# Patient Record
(demographics unavailable — no encounter records)

---

## 2024-10-14 NOTE — PHYSICAL EXAM
[Normal Appearance] : normal appearance [Edema] : no peripheral edema [Exaggerated Use Of Accessory Muscles For Inspiration] : no accessory muscle use [Bowel Sounds] : normal bowel sounds [Abdomen Tenderness] : non-tender [Costovertebral Angle Tenderness] : no ~M costovertebral angle tenderness [Urethral Meatus] : meatus normal [Epididymis] : the epididymides were normal [Testes Tenderness] : no tenderness of the testes [Testes Mass (___cm)] : there were no testicular masses [Normal Station and Gait] : the gait and station were normal for the patient's age [] : no rash

## 2024-10-14 NOTE — ASSESSMENT
[FreeTextEntry1] : 67 y.o. M who presents for gross hematuria, BPH  #Gross hematuria - s/p cystoscopy - negative cytology - CT w/ contrast reviewed - no hydro / renal / ureteral mass - LIkely prostatic bleeding  #BPH  - c/w finasteride - To start flomax -  mL - Discussed the natural history of BPH and bladder outlet obstruction, risks of progression, risks of detrusor myopathy/areflexic bladder, bladder stones, UTI, worsening symptoms, risk of retention and other issues.  - All treatment options were reviewed.  This included surveillance, all medical therapeutic options, all outlet procedures including office based, TURP, bipolar TURP, button vaporization, Rezum, Aquablation, Urolift, thulium/holmium, suprapubic/retropubic simple (open, robotic) prostatectomy, PAE. Side effects discussed  They would like to observe symptoms for now and c/w medical therapy  rpa 3 months

## 2024-10-14 NOTE — HISTORY OF PRESENT ILLNESS
[FreeTextEntry1] : 67 y.o. M who presents as a f/u for BPH  To review, he is a 67 y.o. M who presents as a f/u for hematuria To review, he underwent greenlight PVP 2014. Recently seen by Dr Pryor for gross hematuria 10/1/2024. Then went to ER the next day for gross hematuria / retention.  Underwent CT scan which demonstrated trilobar hyperplasia w/ enlarged median lobe, some clot in the bladder. Then seen by Dr Pryor 10/3/2024 for cystoscopy - trilobar hypertrophy, trabeculations.  Cooper removed and patient able to void  He is on finasteride. Was prescribed tamsulosin at last visit - he is NOT taking this.  No gross hematuria since cooper removal  Regarding his urination, he reports nocturia x4-5. Stream "not strong but not weak." Has to strain sometimes. Daytime frequency q1-2 hours. Rare urgency and rare UUI. Not significant. Does not wear pads. Sometimes has incomplete bladder emptying. No dysuria. No fevers. PSA 2.59 10/1/2024  PVR: 110 mL

## 2024-10-29 NOTE — ASSESSMENT
[FreeTextEntry1] : 68 y.o. M who presents for gross hematuria, BPH  #Gross hematuria - s/p cystoscopy - negative cytology - CT w/ contrast reviewed - no hydro / renal / ureteral mass - Likely prostatic bleeding  #BPH  - c/w finasteride - To start flomax - CT with 90 cc prostate - Discussed the natural history of BPH and bladder outlet obstruction, risks of progression, risks of detrusor myopathy/areflexic bladder, bladder stones, UTI, worsening symptoms, risk of retention and other issues.  - All treatment options were reviewed.  This included surveillance, all medical therapeutic options, all outlet procedures including office based, TURP, bipolar TURP, button vaporization, Rezum, Aquablation, Urolift, thulium/holmium, suprapubic/retropubic simple (open, robotic) prostatectomy, PAE. Side effects discussed - Schedule HOLEP - Patient w/ constipation.  Discussed that straining may worsen hematuria now and after surgery.  Referral to GI

## 2024-10-29 NOTE — HISTORY OF PRESENT ILLNESS
[FreeTextEntry1] : 68 y.o. M who presents as a f/u for BPH  To review, he is a 68 y.o. M who presents as a f/u for hematuria To review, he underwent greenlight PVP 2014. Recently seen by Dr Pryor for gross hematuria 10/1/2024. Then went to ER the next day for gross hematuria / retention.  Underwent CT scan which demonstrated trilobar hyperplasia w/ enlarged median lobe, some clot in the bladder. Then seen by Dr Pryor 10/3/2024 for cystoscopy - trilobar hypertrophy, trabeculations.   He is on finasteride and tamsulosin  Regarding his urination, he reports nocturia x4-5. Stream "not strong but not weak." Has to strain sometimes. Daytime frequency q1-2 hours. Rare urgency and rare UUI. Not significant. Does not wear pads. Sometimes has incomplete bladder emptying. No dysuria. No fevers. PSA 2.59 10/1/2024  He had another episode of gross hematuria ~1 week ago but has resolved. Happened w/ straining for a BM. He is still constipated - using enema, miralax.

## 2024-11-01 NOTE — PHYSICAL EXAM
[Alert] : alert [Normal Voice/Communication] : normal voice/communication [Healthy Appearing] : healthy appearing [No Acute Distress] : no acute distress [Sclera] : the sclera and conjunctiva were normal [Hearing Threshold Finger Rub Not [place]] : hearing was normal [Normal Lips/Gums] : the lips and gums were normal [Oropharynx] : the oropharynx was normal [Normal Appearance] : the appearance of the neck was normal [No Neck Mass] : no neck mass was observed [No Respiratory Distress] : no respiratory distress [No Acc Muscle Use] : no accessory muscle use [Respiration, Rhythm And Depth] : normal respiratory rhythm and effort [Auscultation Breath Sounds / Voice Sounds] : lungs were clear to auscultation bilaterally [Heart Rate And Rhythm] : heart rate was normal and rhythm regular [Normal S1, S2] : normal S1 and S2 [Murmurs] : no murmurs [None] : no edema [Bowel Sounds] : normal bowel sounds [Abdomen Tenderness] : non-tender [No Masses] : no abdominal mass palpated [Abdomen Soft] : soft [] : no hepatosplenomegaly [Normal Sphincter Tone] : normal sphincter tone [No External Hemorrhoid] : no external hemorrhoids [No Rectal Mass] : no rectal mass [Cervical Lymph Nodes Enlarged Posterior Bilaterally] : no posterior cervical lymphadenopathy [Supraclavicular Lymph Nodes Enlarged Bilaterally] : no supraclavicular lymphadenopathy [No CVA Tenderness] : no CVA  tenderness [Abnormal Walk] : normal gait [Motor Exam] : the motor exam was normal [Normal] : oriented to person, place, and time [Oriented To Time, Place, And Person] : oriented to person, place, and time [Normal Affect] : the affect was normal [Normal Mood] : the mood was normal

## 2024-11-01 NOTE — HISTORY OF PRESENT ILLNESS
[FreeTextEntry1] : 68-year-old man with chronic constipation.  Recently had several episodes of hematuria which she saw urology.  Was found to have an enlarged prostate going to need to undergo surgery.  He is referred for further evaluation of his constipation.  He denies rectal bleeding, melena or hematemesis.  He has been taking homemade remedies for the constipation with minimal improvement.  He underwent a screening colonoscopy elsewhere in August 2024 that was normal.

## 2024-11-01 NOTE — ASSESSMENT
[FreeTextEntry1] : Chronic constipation. Dietary and lifestyle modification discussed with the patient and his wife.  Patient started on a regimen of Benefiber daily and stool softener 2-3 times a day.  Follow-up appointment in 1 month.  This was discussed with the patient and his wife.  He understands and all questions were answered.

## 2024-11-01 NOTE — CONSULT LETTER
[Dear  ___] : Dear  [unfilled], [Consult Letter:] : I had the pleasure of evaluating your patient, [unfilled]. [Please see my note below.] : Please see my note below. [Consult Closing:] : Thank you very much for allowing me to participate in the care of this patient.  If you have any questions, please do not hesitate to contact me. [Sincerely,] : Sincerely, [FreeTextEntry3] : Noah Das MD, FACG

## 2025-02-03 NOTE — ASSESSMENT
[FreeTextEntry1] : 68 y.o. M who presents s/p holep. Doing well - PVR 0 mL - UA - off flomax, continue finasteride - RPA 1 month

## 2025-02-03 NOTE — PHYSICAL EXAM
[Normal Appearance] : normal appearance [Edema] : no peripheral edema [] : no respiratory distress [Exaggerated Use Of Accessory Muscles For Inspiration] : no accessory muscle use [Bowel Sounds] : normal bowel sounds [Urethral Meatus] : meatus normal [Epididymis] : the epididymides were normal [Testes Tenderness] : no tenderness of the testes

## 2025-02-03 NOTE — HISTORY OF PRESENT ILLNESS
[FreeTextEntry1] : 68 y.o. M who presents for f/u  Underwent HOLEP 1/2025 for intermittent retention and hematuria Presents for f/u  No blood for ~2 weeks. Started walking more and began to see some gross hematuria at beginning of his stream. This lasted for 3 days and has resolved.  Current physical activity including walking around house and light housework.  No heavy lifting yet  No incontinence Stream is good - was better for the first 2 weeks.   PVR: 0 mL

## 2025-03-10 NOTE — ASSESSMENT
[FreeTextEntry1] : 68 y.o. M who presents s/p holep. Doing well - PVR 0 mL - UA/UCx - off flomax, can d/c finasteride - RPA 3 months

## 2025-03-10 NOTE — HISTORY OF PRESENT ILLNESS
[FreeTextEntry1] : 68 y.o. M who presents for f/u  Underwent HOLEP 1/2025 for intermittent retention and hematuria Presents for f/u  Current physical activity including walking around house and light housework.  No incontinence Stream is good - was better for the first 2 weeks.  PVR: 0 mL  Off flomax, remains on finasteride Feeling some tenderness in prostate when urinates, improving.  No hematuria, fevers, chills.  Still reports good flow, emptying well. States his flow 1 mo after procedure was a "10", now rates as 9.

## 2025-06-19 NOTE — PHYSICAL EXAM
[Normal Appearance] : normal appearance [Edema] : no peripheral edema [Exaggerated Use Of Accessory Muscles For Inspiration] : no accessory muscle use [Abdomen Tenderness] : non-tender [Urethral Meatus] : meatus normal [Epididymis] : the epididymides were normal [Testes Tenderness] : no tenderness of the testes [Testes Mass (___cm)] : there were no testicular masses

## 2025-06-19 NOTE — HISTORY OF PRESENT ILLNESS
[FreeTextEntry1] : 68 y.o. M who presents for f/u  Underwent HOLEP 1/2025 for intermittent retention and hematuria Presents for f/u  NO hematuria, incontinence Flow is strong Off all medication Still w nocturia x3. Drinks water close to bedtime, wife thinks he drinks "too much" No coffee / tea / soda Unknown if patient snores PVR: 0 mL

## 2025-06-19 NOTE — ASSESSMENT
[FreeTextEntry1] : 68 y.o. M who presents s/p holep. Doing well - PVR 0 mL - UA - PSA - Off all meds  annual f/u